# Patient Record
Sex: MALE | Race: ASIAN | NOT HISPANIC OR LATINO | URBAN - METROPOLITAN AREA
[De-identification: names, ages, dates, MRNs, and addresses within clinical notes are randomized per-mention and may not be internally consistent; named-entity substitution may affect disease eponyms.]

---

## 2021-01-21 ENCOUNTER — OFFICE VISIT (OUTPATIENT)
Dept: URGENT CARE | Facility: PHYSICIAN GROUP | Age: 26
End: 2021-01-21
Payer: COMMERCIAL

## 2021-01-21 VITALS
RESPIRATION RATE: 16 BRPM | DIASTOLIC BLOOD PRESSURE: 72 MMHG | TEMPERATURE: 99.5 F | HEART RATE: 90 BPM | BODY MASS INDEX: 25.88 KG/M2 | HEIGHT: 66 IN | WEIGHT: 161 LBS | OXYGEN SATURATION: 93 % | SYSTOLIC BLOOD PRESSURE: 124 MMHG

## 2021-01-21 DIAGNOSIS — G43.009 MIGRAINE WITHOUT AURA AND WITHOUT STATUS MIGRAINOSUS, NOT INTRACTABLE: ICD-10-CM

## 2021-01-21 PROCEDURE — 99204 OFFICE O/P NEW MOD 45 MIN: CPT | Performed by: STUDENT IN AN ORGANIZED HEALTH CARE EDUCATION/TRAINING PROGRAM

## 2021-01-21 RX ORDER — DEXAMETHASONE SODIUM PHOSPHATE 10 MG/ML
10 INJECTION INTRAMUSCULAR; INTRAVENOUS ONCE
Status: COMPLETED | OUTPATIENT
Start: 2021-01-21 | End: 2021-01-21

## 2021-01-21 RX ORDER — ONDANSETRON 4 MG/1
8 TABLET, ORALLY DISINTEGRATING ORAL ONCE
Status: COMPLETED | OUTPATIENT
Start: 2021-01-21 | End: 2021-01-21

## 2021-01-21 RX ORDER — ACETAMINOPHEN 500 MG
500-1000 TABLET ORAL EVERY 6 HOURS PRN
COMMUNITY
End: 2021-03-22

## 2021-01-21 RX ORDER — ONDANSETRON 4 MG/1
4 TABLET, ORALLY DISINTEGRATING ORAL EVERY 6 HOURS PRN
Qty: 20 TAB | Refills: 0 | Status: SHIPPED | OUTPATIENT
Start: 2021-01-21 | End: 2021-03-22

## 2021-01-21 RX ORDER — ELETRIPTAN HYDROBROMIDE 40 MG/1
TABLET, FILM COATED ORAL
Qty: 10 TAB | Refills: 0 | Status: SHIPPED | OUTPATIENT
Start: 2021-01-21 | End: 2021-03-22

## 2021-01-21 RX ORDER — METOCLOPRAMIDE 10 MG/1
TABLET ORAL
Qty: 20 TAB | Refills: 0 | Status: SHIPPED | OUTPATIENT
Start: 2021-01-21 | End: 2021-03-22

## 2021-01-21 RX ADMIN — DEXAMETHASONE SODIUM PHOSPHATE 10 MG: 10 INJECTION INTRAMUSCULAR; INTRAVENOUS at 09:31

## 2021-01-21 RX ADMIN — ONDANSETRON 8 MG: 4 TABLET, ORALLY DISINTEGRATING ORAL at 09:32

## 2021-01-21 NOTE — PROGRESS NOTES
Subjective:   CHIEF COMPLAINT  Chief Complaint   Patient presents with   • Headache     head ache, neasua (7x days)       HPI  Laz Rojas is a 25 y.o. male who presents with a chief complaint of right-sided headache which started approximately 1 week ago.  Reports the symptoms developed after getting off of his computer; says his eyes felt strange and then the headache developed.  He has no prior history of migraines.  No history of trauma or surgery to his head.  Discomfort is mostly located on the right posterior side of his head which can last up to several hours.  Symptoms are worse with eating and associated with nausea and vomiting.  Says the symptoms are also aggravated with looking at a computer screen.  Symptoms are better with Advil and Tylenol however only provide transient relief of symptoms.  The patient wears glasses and recently had his prescription updated.     REVIEW OF SYSTEMS  General: no fever or chills  Skin: no rashes or new lesions  Eyes: no blurry vision or pain  ENT: no congestion, runny nose  Cardiovascular: no chest pain or palpitations   Resp: no cough or SOB  GI: admits nausea and  vomiting  Neuro: numbness or tingling   Endo: no heat/cold intolerance or excessive thirst  Psych: no anxiety or depression  See HPI for further details.    PAST MEDICAL HISTORY  There are no active problems to display for this patient.      SURGICAL HISTORY  patient denies any surgical history    ALLERGIES  No Known Allergies    CURRENT MEDICATIONS  Home Medications     Reviewed by Uli Denton Ass't (Medical Assistant) on 01/21/21 at 0905  Med List Status: <None>   Medication Last Dose Status   acetaminophen (TYLENOL) 500 MG Tab  Active                SOCIAL HISTORY  Social History     Tobacco Use   • Smoking status: Never Smoker   • Smokeless tobacco: Never Used   Substance and Sexual Activity   • Alcohol use: Not on file   • Drug use: Not on file   • Sexual activity: Not on file       FAMILY  "HISTORY  Family History   Problem Relation Age of Onset   • Diabetes Father    • Cancer Paternal Grandmother         breast cancer   • Heart Disease Paternal Grandfather           Objective:   PHYSICAL EXAM  VITAL SIGNS: /72 (BP Location: Left arm, Patient Position: Sitting, BP Cuff Size: Adult)   Pulse 90   Temp 37.5 °C (99.5 °F) (Temporal)   Resp 16   Ht 1.676 m (5' 6\")   Wt 73 kg (161 lb)   SpO2 93%   BMI 25.99 kg/m²     Gen: no acute distress, normal voice  Skin: dry, intact, moist mucosal membranes  Eyes: no conjunctival infection b/l  ENT: no cervical lymphadenopathy   Neck: no meningeal signs, full ROM  Lungs: CTAB w/ symmetric expansion  CV: RRR w/o murmurs or clicks  Neuro: Speech: conversant, fluent, no aphasia  Mental status: AAOx3  Gait: normal   CN: 2-12 grossly intact  Sensory exam: globally intact  Motor exam: no global deficits   Psych: normal affect, normal judgement, alert, awake    Assessment/Plan:     1. Migraine without aura and without status migrainosus, not intractable  dexamethasone (DECADRON) injection (check route below) 10 mg    ondansetron (ZOFRAN ODT) dispertab 8 mg   Exam reassuring.  No red flags.  Will try symptomatic treatment.  -Dexamethasone 10 mg IM x1 given in the clinic and Zofran 8 mg ODT x1  -Rx is sent for Zofran, Reglan, Relpax  -Instructed to take Benadryl tonight  -Hydration  -Referred to primary care  -Instructed patient to return to the clinic or go to the emergency room if at any point the symptoms worsen or if they do not begin to improve within 24 to 48 hours.  Next step would be imaging.  Patient verbalized understanding.  All questions were answered.    Differential diagnosis, natural history, supportive care, and indications for immediate follow-up discussed. All questions answered. Patient agrees with the plan of care.    Follow-up as needed if symptoms worsen or fail to improve to PCP, Urgent care or Emergency Room.    Please note that this " dictation was created using voice recognition software. I have made a reasonable attempt to correct obvious errors, but I expect that there are errors of grammar and possibly content that I did not discover before finalizing the note.

## 2021-03-22 ENCOUNTER — OFFICE VISIT (OUTPATIENT)
Dept: MEDICAL GROUP | Age: 26
End: 2021-03-22
Payer: COMMERCIAL

## 2021-03-22 VITALS
HEIGHT: 66 IN | SYSTOLIC BLOOD PRESSURE: 104 MMHG | OXYGEN SATURATION: 100 % | DIASTOLIC BLOOD PRESSURE: 68 MMHG | BODY MASS INDEX: 27.16 KG/M2 | HEART RATE: 66 BPM | TEMPERATURE: 98.8 F | WEIGHT: 169 LBS

## 2021-03-22 DIAGNOSIS — Z23 NEED FOR VACCINATION: ICD-10-CM

## 2021-03-22 DIAGNOSIS — R11.0 NAUSEA: ICD-10-CM

## 2021-03-22 DIAGNOSIS — Z76.89 ENCOUNTER TO ESTABLISH CARE WITH NEW DOCTOR: Primary | ICD-10-CM

## 2021-03-22 DIAGNOSIS — Z87.898 HISTORY OF HEADACHE: ICD-10-CM

## 2021-03-22 DIAGNOSIS — Z00.00 PE (PHYSICAL EXAM), ANNUAL: ICD-10-CM

## 2021-03-22 PROCEDURE — 99385 PREV VISIT NEW AGE 18-39: CPT | Performed by: FAMILY MEDICINE

## 2021-03-22 ASSESSMENT — PATIENT HEALTH QUESTIONNAIRE - PHQ9: CLINICAL INTERPRETATION OF PHQ2 SCORE: 0

## 2021-03-23 NOTE — PROGRESS NOTES
CC: establish care     HPI:     Laz Rojas is a 25 y.o. male with no major medical or surgical history.  Patient is sexually active with stable partner.  Negative history of STD.  He currently does not take any medication.  Familial history is positive for both parents with type 2 diabetes, hypertension, hyperlipidemia.  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  He is active and exercise regularly.  A few months ago, he developed acute pressure headache that feel like a band squeezing his head. He states that the pain is a worse as the caregiver had.  He did not have any history of head trauma. Patient was seen by urgent care and was diagnosed with migraine.  He was treated with Decadron, Zofran, Reglan, and Relpax.  His symptom has now resolved.  Negative personal or familial history of migraine.  Negative aura.  Patient continues to do well after.  He states that he used to drink a lot of caffeinated drinks (coffeee, energy drink, caffeine-containing exercise supplements) and did have abrupt reduction of daily caffeine in take to 1 cup of coffee per day after the 1st of the year. He thinks that abrupt withdrawal of caffeine might be contributing to his symptoms. He continues to drink 1 cup of coffee per day now and has not had any problems except for mild nausea in the morning after drinking coffee at breakfast. Nausea resolved after about 1 hour. He is doing well for the rest of the days.     He otherwise is doing well. He declined STD screen and HPV vaccine.     Current medicines (including changes today)  No current outpatient medications on file.     No current facility-administered medications for this visit.     He  has no past medical history of Allergy, Arrhythmia, Arthritis, ASTHMA, Diabetes, Heart murmur, Muscle disorder, or Seizure ([facility]).  He  has no past surgical history on file.  Social History     Tobacco Use   • Smoking status: Never Smoker   • Smokeless tobacco: Never Used  "  Substance Use Topics   • Alcohol use: Never   • Drug use: Never     Social History     Social History Narrative   • Not on file     Family History   Problem Relation Age of Onset   • Diabetes Father    • Hypertension Father    • Hyperlipidemia Father    • Cancer Paternal Grandmother         breast cancer   • Heart Disease Paternal Grandfather    • Diabetes Mother    • Hypertension Mother    • Hyperlipidemia Mother    • No Known Problems Brother      Family Status   Relation Name Status   • Fa  Alive   • PGMo  (Not Specified)   • PGFa  (Not Specified)   • Mo  Alive   • Bro  Alive       I personally reviewed patient's problem list, allergies, medications, family hx, social hx with patient and update EPIC.     REVIEW OF SYSTEMS:  CONSTITUTIONAL:  Denies night sweats, fatigue, malaise, lethargy, fever or chills.  RESPIRATORY:  Denies cough, wheeze, hemoptysis, or shortness of breath.  CARDIOVASCULAR:  Denies chest pains, palpitations, pedal edema  GASTROINTESTINAL:  Denies abdominal pain, nausea or vomiting, diarrhea, constipation, hematemesis, hematochezia, melena.  GENITOURINARY:  Denies urinary urgency, frequency, dysuria, or hematuria.  No obstructive symptoms.  Denies unusual discharge.    All other systems reviewed and are negative     Objective:     /68 (BP Location: Left arm, Patient Position: Sitting, BP Cuff Size: Adult)   Pulse 66   Temp 37.1 °C (98.8 °F) (Temporal)   Ht 1.676 m (5' 6\")   Wt 76.7 kg (169 lb)   SpO2 100%  Body mass index is 27.28 kg/m².  Physical Exam:    Constitutional: Awake, alert, in no apparent distress.  Skin: Warm, dry, good turgor, no rashes/jaundice in visible areas.  Eye: PERRL, intact EOM, conjunctiva clear, lids normal.  ENMT: TM and auditory canal wnl, nasal & oral mucosa wnl, lips without lesions, good dentition, oropharynx clear.  Neck: Trachea midline, no masses, no thyromegaly. No cervical or supraclavicular lymphadenopathy.  Respiratory: Unlabored respiratory " effort, lungs clear to auscultation, no wheezes, no rales.  Cardiovascular: Normal S1, S2, no murmur, no rubs, no gallops, no pedal edema.  Abdomen: Soft, active bowel sounds, non-tender to palpation, no masses, no hepatosplenomegaly, no rebound or guarding.  Psych: Alert and oriented x3, affect and mood wnl, intact judgement and insight.       Assessment and Plan:   The following treatment plan was discussed    1. History of headache  Acute headache in 1/2021. Pt was seen by  in 1/2021 and was diagnosed with migraine. His symptoms resolved after a few days. Neg family history or personal history of migraine headache. Neg hx of head trauma. Pt was advised to continue to monitor and keep tract of his symptoms. He is to follow up with me if he has more occurrences of similar headache in the future. Pt was encouraged to reduce daily coffee consumption to 1 cup per day. He is also encouraged to continue to exercises and adhere to healthy diet.     2. Nausea  Acute, mild, morning nausea with coffee over the past few days. Symptoms last for about 1 hour then resolved w/o problems. He is doing well for the rest of the days. He is not interested in further workups/treatment at this time. He wants to continue to monitor symptoms, hopefully this is transient and will resolve w/o treatment.     3. Need for vaccination  - Meningococcal Vaccine Serogroup B 2-3 Dose IM; Future  - Tdap Vaccine =>8YO IM; Future    4. PE (physical exam), annual  5. Encounter to establish care with new doctor  General health and wellness counseling provided.      - CBC WITH DIFFERENTIAL; Future  - Comp Metabolic Panel; Future  - HEMOGLOBIN A1C; Future  - Lipid Profile; Future    Ly HENRRY Whipple M.D.    Records requested.  Followup: Return for As needed.    Please note that this dictation was created using voice recognition software. I have made every reasonable attempt to correct obvious errors, but I expect that there are errors of grammar and  possibly content that I did not discover before finalizing the note.

## 2021-05-11 ENCOUNTER — TELEPHONE (OUTPATIENT)
Dept: MEDICAL GROUP | Age: 26
End: 2021-05-11

## 2021-05-11 DIAGNOSIS — Z23 NEED FOR VACCINATION: ICD-10-CM

## 2021-05-11 NOTE — TELEPHONE ENCOUNTER
Patient is on the MA Schedule 05/26/2021 for HPV  vaccine/injection.    SPECIFIC Action To Be Taken: Orders pending, please sign.

## 2025-07-07 ENCOUNTER — OFFICE VISIT (OUTPATIENT)
Dept: URGENT CARE | Facility: PHYSICIAN GROUP | Age: 30
End: 2025-07-07
Payer: COMMERCIAL

## 2025-07-07 ENCOUNTER — HOSPITAL ENCOUNTER (OUTPATIENT)
Dept: RADIOLOGY | Facility: MEDICAL CENTER | Age: 30
End: 2025-07-07
Attending: FAMILY MEDICINE
Payer: COMMERCIAL

## 2025-07-07 VITALS
HEART RATE: 83 BPM | DIASTOLIC BLOOD PRESSURE: 70 MMHG | BODY MASS INDEX: 30.45 KG/M2 | SYSTOLIC BLOOD PRESSURE: 130 MMHG | RESPIRATION RATE: 15 BRPM | OXYGEN SATURATION: 97 % | WEIGHT: 194 LBS | HEIGHT: 67 IN | TEMPERATURE: 99.1 F

## 2025-07-07 DIAGNOSIS — S93.491A SPRAIN OF OTHER LIGAMENT OF RIGHT ANKLE, INITIAL ENCOUNTER: ICD-10-CM

## 2025-07-07 DIAGNOSIS — M25.571 ACUTE RIGHT ANKLE PAIN: ICD-10-CM

## 2025-07-07 DIAGNOSIS — M25.571 ACUTE RIGHT ANKLE PAIN: Primary | ICD-10-CM

## 2025-07-07 PROCEDURE — 3075F SYST BP GE 130 - 139MM HG: CPT | Performed by: FAMILY MEDICINE

## 2025-07-07 PROCEDURE — 73610 X-RAY EXAM OF ANKLE: CPT | Mod: RT

## 2025-07-07 PROCEDURE — 3078F DIAST BP <80 MM HG: CPT | Performed by: FAMILY MEDICINE

## 2025-07-07 PROCEDURE — 99214 OFFICE O/P EST MOD 30 MIN: CPT | Performed by: FAMILY MEDICINE

## 2025-07-07 NOTE — PROGRESS NOTES
"Subjective:   Laz Rojas is a 30 y.o. male who presents for Ankle Injury (Right ankle injury x 1 day. Injured and sprained it during football. Injured ankle before years ago. Swollen and bruised. Has been doing RICE. And taking ibuprofen for the pain.)    Patient comes in after 1 day history of an ankle injury was playing football and stepped in a divot and slipped.  He still able to weight-bear.  But has significant bruising and swelling.  No other injuries.  Did have an ankle injury there but this was several years ago.  Since then has been wrapping elevating and using Advil      ROS    Medications, Allergies, and current problem list reviewed today in Epic.     Objective:     /70 (BP Location: Left arm, Patient Position: Sitting, BP Cuff Size: Adult long)   Pulse 83   Temp 37.3 °C (99.1 °F) (Temporal)   Resp 15   Ht 1.702 m (5' 7\")   Wt 88 kg (194 lb 0.1 oz)   SpO2 97%     Physical Exam  Constitutional:       Appearance: Normal appearance.   Cardiovascular:      Rate and Rhythm: Normal rate and regular rhythm.   Pulmonary:      Effort: Pulmonary effort is normal.      Breath sounds: Normal breath sounds.   Musculoskeletal:      Comments: Patient does have significant bruising on his right ankle near the bases of the heel on both sides.  On examination has tenderness at both medial and lateral malleolus has pain on pain on flexion definitely altered gait and heel walking   Skin:     General: Skin is warm and dry.   Neurological:      General: No focal deficit present.      Mental Status: He is alert and oriented to person, place, and time.   Psychiatric:         Mood and Affect: Mood normal.         Behavior: Behavior normal.         Assessment/Plan:     Assessment & Plan  Acute right ankle pain  Reviewed the x-rays with the patient no obvious signs of fractures radiology over read  Orders:    DX-ANKLE 3+ VIEWS RIGHT; Future    Sprain of other ligament of right ankle, initial " encounter  This point appears to be just as a really bad sprain Go ahead and do Aleve p.o. twice daily for at least 10 days.  Range of motion exercises taught.  Elevate.  Paulino duarte Junior.  Will rewrap with just an Ace bandage.  Follow-up if not improving questions answered  Orders:    DX-ANKLE 3+ VIEWS RIGHT; Future        Differential diagnosis, natural history, supportive care, and indications for immediate follow-up discussed.    Advised the patient to follow-up with the primary care physician for recheck, reevaluation, and consideration of further management.    Please note that this dictation was created using voice recognition software. I have made a reasonable attempt to correct obvious errors, but I expect that there are errors of grammar and possibly content that I did not discover before finalizing the note.    This note was electronically signed by Holden FIGUEREDO M.D.